# Patient Record
Sex: FEMALE | Race: WHITE | URBAN - NONMETROPOLITAN AREA
[De-identification: names, ages, dates, MRNs, and addresses within clinical notes are randomized per-mention and may not be internally consistent; named-entity substitution may affect disease eponyms.]

---

## 2022-04-06 ENCOUNTER — OFFICE VISIT (OUTPATIENT)
Dept: URBAN - NONMETROPOLITAN AREA CLINIC 6 | Facility: CLINIC | Age: 37
End: 2022-04-06

## 2022-04-06 DIAGNOSIS — H52.13 MYOPIA, BILATERAL: ICD-10-CM

## 2022-04-06 DIAGNOSIS — H52.223 REGULAR ASTIGMATISM, BILATERAL: Primary | ICD-10-CM

## 2022-04-06 PROCEDURE — S0620 ROUTINE OPHTHALMOLOGICAL EXA: HCPCS | Performed by: OPTOMETRIST

## 2022-04-06 ASSESSMENT — INTRAOCULAR PRESSURE
OD: 14
OS: 14

## 2022-04-06 ASSESSMENT — VISUAL ACUITY
OD: 20/20
OS: 20/20

## 2022-04-06 NOTE — IMPRESSION/PLAN
Impression: Regular astigmatism, bilateral: H52.223. Plan: Discussed diagnosis in detail with patient. Glasses Rx and Contact lens Rx updated. Discussed proper wearing schedule and replacement of Contact lenses. Call if any changes to vision occur.